# Patient Record
Sex: MALE | Race: WHITE | NOT HISPANIC OR LATINO | Employment: FULL TIME | URBAN - METROPOLITAN AREA
[De-identification: names, ages, dates, MRNs, and addresses within clinical notes are randomized per-mention and may not be internally consistent; named-entity substitution may affect disease eponyms.]

---

## 2017-08-22 ENCOUNTER — OFFICE VISIT (OUTPATIENT)
Dept: FAMILY MEDICINE | Facility: CLINIC | Age: 52
End: 2017-08-22
Payer: COMMERCIAL

## 2017-08-22 VITALS
HEART RATE: 112 BPM | SYSTOLIC BLOOD PRESSURE: 110 MMHG | OXYGEN SATURATION: 98 % | HEIGHT: 69 IN | WEIGHT: 260.13 LBS | RESPIRATION RATE: 16 BRPM | DIASTOLIC BLOOD PRESSURE: 70 MMHG | TEMPERATURE: 98 F | BODY MASS INDEX: 38.53 KG/M2

## 2017-08-22 DIAGNOSIS — G47.00 INSOMNIA, UNSPECIFIED TYPE: Primary | ICD-10-CM

## 2017-08-22 PROCEDURE — 99214 OFFICE O/P EST MOD 30 MIN: CPT | Mod: S$GLB,,, | Performed by: NURSE PRACTITIONER

## 2017-08-22 PROCEDURE — 99999 PR PBB SHADOW E&M-EST. PATIENT-LVL IV: CPT | Mod: PBBFAC,,, | Performed by: NURSE PRACTITIONER

## 2017-08-22 PROCEDURE — 3008F BODY MASS INDEX DOCD: CPT | Mod: S$GLB,,, | Performed by: NURSE PRACTITIONER

## 2017-08-22 RX ORDER — HYDROXYZINE HYDROCHLORIDE 25 MG/1
25 TABLET, FILM COATED ORAL 3 TIMES DAILY PRN
Qty: 30 TABLET | Refills: 0 | Status: SHIPPED | OUTPATIENT
Start: 2017-08-22

## 2017-08-22 RX ORDER — TRAZODONE HYDROCHLORIDE 50 MG/1
50 TABLET ORAL NIGHTLY
Qty: 30 TABLET | Refills: 2 | Status: SHIPPED | OUTPATIENT
Start: 2017-08-22 | End: 2017-09-13 | Stop reason: SDUPTHER

## 2017-08-22 NOTE — LETTER
August 22, 2017      aVnce Montiel   [address] [facility] [place] [place] [address]             [facility]  [address]  [address]  [place] [address]  Phone: [phone]  Fax: [phone] Vance Montiel    Was treated here on 08/22/2017    May Return to work/school on 8/24/2017.    No Restrictions            Keily Lo, NP

## 2017-08-22 NOTE — PATIENT INSTRUCTIONS
Helping Yourself Through Grief and Loss  Do what you can to stay healthy. Reaching out for support will help a great deal. You may find yourself asking: Why? Its normal to seek meaning by asking questions, but theres not always an answer for loss. With time, your loss may still be part of your life, but not the only thing in it.    Take care of yourself  Taking good care of yourself helps your body heal from the physical and emotional symptoms of grief. Pay extra attention to healthy exercise, sleep, and eating routines. What else do you need to feel better? Having family around can help you feel loved. Or you might need a walk or movie with friends to take your mind off things for a little while.  Accept support  Joining the world again is part of healing. These tips may help:  · Stay in touch with family and friends, even if its hard to talk.  · Tell people how they can help. It can be as simple as walking your dog.  · Stick to a daily routine that keeps you connected to friends and family.  · Try to avoid making major decisions   · Attend a support group of people who have been through the same type of loss.  When to get help  There's no normal length of time to grieve. But if you feel stuck and unable to move on, or if it has been 6 months or more since your loss and you still have signs of grief listed below, it may be time for professional help. Seeking professional help is not a sign of weakness. It indicates that you are taking responsibility for your recovery. Be alert to depression and call your healthcare provider if you:  · Cant go to work or take care of the kids.  · Cant eat or sleep normally.  · Feel helpless, hopeless, or worthless.  · Lose interest in hobbies, friends, and activities that used to give pleasure.  · Gain or lose a lot of weight.  · Feel your grief is getting worse, or not getting any better.  · Have repeated thoughts of suicide or of harming yourself. You can also call 9-1-1  or a crisis hotline (located in the yellow pages of your phonebook) if you have these thoughts.  At some point, youll begin thinking about the future. Youll want to look ahead and make plans. To help yourself reach this point, try to do one thing each day to join in life. Keep at it, even if it feels strange at first. Your life can never be exactly the same. But one day youll find youre living life fully again.  Date Last Reviewed: 11/10/2015  © 8291-0748 [employer]. [address] [place] [address]. All rights reserved. This information is not intended as a substitute for professional medical care. Always follow your healthcare professional's instructions.        Grief Reaction  Grief is the feeling that we all have when we lose someone or something that has been important in our life. Grief is an unavoidable and normal reaction to this loss, and can last from months to years. The amount of time depends on different factors. These include how close the person was to you, and how much support you have through the grief process. Symptoms can be both physical and emotional.  Physical reactions:  · Loss of appetite or overeating  · Changes in weight  · Trouble getting to sleep or staying asleep  · Hair loss  · Upset stomach, indigestion, heart burn, abdominal pain, cramping, diarrhea  · Sense of trouble breathing  · Trembling, shakiness  Emotional reactions:  · Sadness  · Anxiety  · Feeling depressed or helpless  · Difficulty concentrating  · Detachment or withdrawal from those around you  · Loss of interest in your normal life and work  Home care  · Allow yourself to feel the pain of your loss. For some, this can be a key part of healing grief. Talk about your pain with others who understand. Share good memories that involve the person, pet, or possession  you lost.  · Take time for yourself. Make it a point to do things that you enjoy (gardening, walking in nature, going to a movie,  etc.).  · Take care of your physical body. Eat a balanced diet (low in saturated fat and high in fruits and vegetables) and establish an exercise plan at least 3 times a week for 30 minutes. Even mild-moderate exercise (like brisk walking) can make you feel better. Get plenty of sleep.  · Avoid the use of alcohol and drugs to cover your emotional pain. This only slows down the emotional healing process.  · Do not isolate yourself from others. Have daily contact with family or friends. Talk about your loss to those closest to you.  · For additional support, meet with your //rabbi, a counselor or therapist, or your own doctor.  · Consider joining a grief support group. Ask your doctor or our staff for information on how to find one in your area.  · If you have been prescribed a medicine to help with your symptoms, take it only as directed. Do not use it with alcohol.  Follow-up care  Follow up with your healthcare provider, or as advised.  Call 911  Call 911 if any of these occur:  · Trouble breathing  · Very confused  · Very drowsy or trouble awakening  · Fainting or loss of consciousness  · Rapid heart rate  · Seizure  · New chest pain that becomes more severe, lasts longer, or spreads into your shoulder, arm, neck, jaw or back  When to seek medical advice  Call your healthcare provider right away if any of these occur:  · Worsening symptoms  · Unable to eat or sleep for three days in a row  · Feeling extreme depression, fear, anxiety, or anger toward yourself or others  · Feeling out of control  · Feeling that you may try to harm yourself  · family or friends express concern over your behavior and ask you to get help  Date Last Reviewed: 9/29/2015  © 8278-5895 [employer]. [address] [place] [address], [place] [address]. All rights reserved. This information is not intended as a substitute for professional medical care. Always follow your healthcare professional's instructions.

## 2017-08-23 ENCOUNTER — OFFICE VISIT (OUTPATIENT)
Dept: FAMILY MEDICINE | Facility: CLINIC | Age: 52
End: 2017-08-23
Payer: COMMERCIAL

## 2017-08-23 VITALS — DIASTOLIC BLOOD PRESSURE: 56 MMHG | SYSTOLIC BLOOD PRESSURE: 90 MMHG

## 2017-08-23 DIAGNOSIS — I10 HYPERTENSION, ESSENTIAL, BENIGN: ICD-10-CM

## 2017-08-23 DIAGNOSIS — E66.01 SEVERE OBESITY (BMI 35.0-39.9) WITH COMORBIDITY: ICD-10-CM

## 2017-08-23 DIAGNOSIS — E78.5 HYPERLIPIDEMIA, UNSPECIFIED HYPERLIPIDEMIA TYPE: ICD-10-CM

## 2017-08-23 DIAGNOSIS — E11.9 TYPE 2 DIABETES MELLITUS WITHOUT COMPLICATION, WITHOUT LONG-TERM CURRENT USE OF INSULIN: ICD-10-CM

## 2017-08-23 DIAGNOSIS — F51.02 ADJUSTMENT INSOMNIA: Primary | ICD-10-CM

## 2017-08-23 PROCEDURE — 3008F BODY MASS INDEX DOCD: CPT | Mod: S$GLB,,, | Performed by: INTERNAL MEDICINE

## 2017-08-23 PROCEDURE — 4010F ACE/ARB THERAPY RXD/TAKEN: CPT | Mod: S$GLB,,, | Performed by: INTERNAL MEDICINE

## 2017-08-23 PROCEDURE — 99999 PR PBB SHADOW E&M-EST. PATIENT-LVL II: CPT | Mod: PBBFAC,,, | Performed by: INTERNAL MEDICINE

## 2017-08-23 PROCEDURE — 99214 OFFICE O/P EST MOD 30 MIN: CPT | Mod: S$GLB,,, | Performed by: INTERNAL MEDICINE

## 2017-08-23 NOTE — LETTER
August 23, 2017      [place] [facility]  [address]  [address]  [place] [address]  Phone: [phone]  Fax: [phone]       Patient: Vance Montiel   YOB: 1965  Date of Visit: 08/23/2017    To Whom It May Concern:    Violeta Montiel  was at [facility] on 08/23/2017. He may return to work/school on 8/28/17 with no restrictions. If you have any questions or concerns, or if I can be of further assistance, please do not hesitate to contact me.    Sincerely,    Pauline Sage MD

## 2017-08-24 ENCOUNTER — CLINICAL SUPPORT (OUTPATIENT)
Dept: FAMILY MEDICINE | Facility: CLINIC | Age: 52
End: 2017-08-24
Payer: COMMERCIAL

## 2017-08-24 VITALS — SYSTOLIC BLOOD PRESSURE: 108 MMHG | DIASTOLIC BLOOD PRESSURE: 60 MMHG

## 2017-08-24 NOTE — PROGRESS NOTES
Pt presented with a BP of 108/60 after holding meds. He states he feels better today and does not feel lightheaded and fatigued like yesterday when he was noted have a BP of 90/56. Pt has purchased BP cuff and was advised to check his BP daily to keep a BP log to present to his PCP. Pt advised to make an appt for f/u with PCP in 2 weeks and hold meds until f/u. Pt with recent 18lb weight loss, which has likely contributed to the lower BP. Pt advised to call back if BP reaches >/=140/90 for further instructions if he has difficulty getting in to see his PCP. He voiced understanding.

## 2017-08-24 NOTE — PROGRESS NOTES
Patient here for Blood pressure check, CZ=615/60, Patient states that he was instructed to hold his BP medication.  Dr Sage notified of patient's BP and she spoke to patient to give him further instructions.

## 2017-08-25 NOTE — PROGRESS NOTES
Patient Name: Vance Montiel    : 1965  MRN: [id number]    Subjective:  Vance is a 51 y.o. male who presents today for     1. Sleep aid following the death of his 26 yo daughter on 2017. Tried tylenol prn without improvement. He is follow by his pcp, Dr. Kline, for chronic medical problems, but was unable to get a sooner appointment    Past Medical History  Past Medical History:   Diagnosis Date    Diabetes mellitus, type 2     Hypertension        Past Surgical History  History reviewed. No pertinent surgical history.    Family History  History reviewed. No pertinent family history.    Social History  Social History     Social History    Marital status:      Spouse name: N/A    Number of children: N/A    Years of education: N/A     Occupational History    Not on file.     Social History Main Topics    Smoking status: Never Smoker    Smokeless tobacco: Never Used    Alcohol use Yes    Drug use: Unknown    Sexual activity: Not on file     Other Topics Concern    Not on file     Social History Narrative    No narrative on file       Allergies  Review of patient's allergies indicates:  No Known Allergies -reviewed and updated      Medications  Reviewed and updated.   Current Outpatient Prescriptions   Medication Sig Dispense Refill    amlodipine (NORVASC) 10 MG tablet Take 10 mg by mouth once daily.  6    atorvastatin (LIPITOR) 40 MG tablet Take 40 mg by mouth nightly.  3    CONTOUR NEXT STRIPS Strp test TWICE DAILY AS DIRECTED  0    lisinopril-hydrochlorothiazide (PRINZIDE,ZESTORETIC) 20-25 mg Tab Take 1 tablet by mouth once daily.  6    metformin (GLUCOPHAGE) 1000 MG tablet Take 1,000 mg by mouth 2 (two) times daily.  3    MICROLET LANCET Misc test AS DIRECTED  3    hydrOXYzine HCl (ATARAX) 25 MG tablet Take 1 tablet (25 mg total) by mouth 3 (three) times daily as needed for Anxiety. (caution drowsiness, no driving) 30 tablet 0    trazodone (DESYREL) 50 MG tablet Take 1 tablet  "(50 mg total) by mouth every evening. 30 tablet 2     No current facility-administered medications for this visit.          Review of Systems   Constitutional: Negative for chills and fever.   Respiratory: Negative for shortness of breath.    Cardiovascular: Negative for chest pain.   Psychiatric/Behavioral: The patient has insomnia.          Physical Exam  /70   Pulse (!) 112   Temp 98.4 °F (36.9 °C) (Oral)   Resp 16   Ht 5' 8.5" (1.74 m)   Wt 118 kg (260 lb 2.3 oz)   SpO2 98%   BMI 38.98 kg/m²   Physical Exam   Constitutional: He is oriented to person, place, and time. He appears well-developed.   HENT:   Head: Normocephalic.   Cardiovascular: Normal rate and regular rhythm.    Pulmonary/Chest: Effort normal and breath sounds normal.   Neurological: He is alert and oriented to person, place, and time.   Skin: He is not diaphoretic.         Assessment/Plan:  Vance Montiel is a 51 y.o. male who presents today for :    Insomnia, secondary to grief  Trial trazodone and atarax prn, offer referral counseling   -     trazodone (DESYREL) 50 MG tablet; Take 1 tablet (50 mg total) by mouth every evening.  Dispense: 30 tablet; Refill: 2  -     hydrOXYzine HCl (ATARAX) 25 MG tablet; Take 1 tablet (25 mg total) by mouth 3 (three) times daily as needed for Anxiety. (caution drowsiness, no driving)  Dispense: 30 tablet; Refill: 0        Return if symptoms worsen or fail to improve.      "

## 2017-08-28 ENCOUNTER — OFFICE VISIT (OUTPATIENT)
Dept: FAMILY MEDICINE | Facility: CLINIC | Age: 52
End: 2017-08-28
Payer: COMMERCIAL

## 2017-08-28 VITALS
SYSTOLIC BLOOD PRESSURE: 156 MMHG | WEIGHT: 265.88 LBS | HEART RATE: 95 BPM | TEMPERATURE: 98 F | OXYGEN SATURATION: 97 % | HEIGHT: 69 IN | DIASTOLIC BLOOD PRESSURE: 90 MMHG | BODY MASS INDEX: 39.38 KG/M2

## 2017-08-28 DIAGNOSIS — E78.5 HYPERLIPIDEMIA, UNSPECIFIED HYPERLIPIDEMIA TYPE: ICD-10-CM

## 2017-08-28 DIAGNOSIS — E11.9 TYPE 2 DIABETES MELLITUS WITHOUT COMPLICATION, WITHOUT LONG-TERM CURRENT USE OF INSULIN: ICD-10-CM

## 2017-08-28 DIAGNOSIS — I10 HYPERTENSION, ESSENTIAL, BENIGN: Primary | ICD-10-CM

## 2017-08-28 PROCEDURE — 3008F BODY MASS INDEX DOCD: CPT | Mod: S$GLB,,, | Performed by: INTERNAL MEDICINE

## 2017-08-28 PROCEDURE — 4010F ACE/ARB THERAPY RXD/TAKEN: CPT | Mod: S$GLB,,, | Performed by: INTERNAL MEDICINE

## 2017-08-28 PROCEDURE — 99999 PR PBB SHADOW E&M-EST. PATIENT-LVL III: CPT | Mod: PBBFAC,,, | Performed by: INTERNAL MEDICINE

## 2017-08-28 PROCEDURE — 99214 OFFICE O/P EST MOD 30 MIN: CPT | Mod: S$GLB,,, | Performed by: INTERNAL MEDICINE

## 2017-08-28 NOTE — LETTER
August 28, 2017      [place] [facility]  [address]  [address]  [place] [address]  Phone: [phone]  Fax: [phone]       Patient: Vance Montiel   YOB: 1965  Date of Visit: 08/28/2017    To Whom It May Concern:    Violeta Montiel  was at [facility] on 08/28/2017. He may return to work/school on 8/29/17 with no restrictions. If you have any questions or concerns, or if I can be of further assistance, please do not hesitate to contact me.    Sincerely,    Pauline Sage MD

## 2017-08-28 NOTE — PROGRESS NOTES
SUBJECTIVE     Chief Complaint   Patient presents with    Hypertension       HPI  Vance Montiel is a 51 y.o. male with multiple medical diagnoses as listed in the medical history and problem list that presents for follow-up for HTN. He has been checking his BP with ranges from 88/56 the same day of presentation last week. He has since had a BP ranging from 101-177/75-93 with a pulse of . He has been holding his BP meds since discharge last week. He did have a headache last night, which he accredits to the elevated BP.  He tried to get in to see his PCP, but there was not an appointment available until 9/7/17 so he presented here today.    PAST MEDICAL HISTORY:  Past Medical History:   Diagnosis Date    Diabetes mellitus, type 2     Hypertension        PAST SURGICAL HISTORY:  History reviewed. No pertinent surgical history.    SOCIAL HISTORY:  Social History     Social History    Marital status:      Spouse name: N/A    Number of children: N/A    Years of education: N/A     Occupational History    Not on file.     Social History Main Topics    Smoking status: Never Smoker    Smokeless tobacco: Never Used    Alcohol use Yes    Drug use: Unknown    Sexual activity: Not on file     Other Topics Concern    Not on file     Social History Narrative    No narrative on file       FAMILY HISTORY:  History reviewed. No pertinent family history.    ALLERGIES AND MEDICATIONS: updated and reviewed.  Review of patient's allergies indicates:  No Known Allergies  Current Outpatient Prescriptions   Medication Sig Dispense Refill    atorvastatin (LIPITOR) 40 MG tablet Take 40 mg by mouth nightly.  3    hydrOXYzine HCl (ATARAX) 25 MG tablet Take 1 tablet (25 mg total) by mouth 3 (three) times daily as needed for Anxiety. (caution drowsiness, no driving) 30 tablet 0    metformin (GLUCOPHAGE) 1000 MG tablet Take 1,000 mg by mouth 2 (two) times daily.  3    trazodone (DESYREL) 50 MG tablet Take 1 tablet  "(50 mg total) by mouth every evening. 30 tablet 2    amlodipine (NORVASC) 10 MG tablet Take 10 mg by mouth once daily.  6    CONTOUR NEXT STRIPS Strp test TWICE DAILY AS DIRECTED  0    lisinopril-hydrochlorothiazide (PRINZIDE,ZESTORETIC) 20-25 mg Tab Take 1 tablet by mouth once daily.  6    MICROLET LANCET Misc test AS DIRECTED  3     No current facility-administered medications for this visit.        ROS  Review of Systems   Constitutional: Negative for chills and fever.   HENT: Negative for hearing loss and sore throat.    Eyes: Negative for visual disturbance.   Respiratory: Negative for cough and shortness of breath.    Cardiovascular: Negative for chest pain, palpitations and leg swelling.   Gastrointestinal: Negative for abdominal pain, constipation, diarrhea, nausea and vomiting.   Genitourinary: Negative for dysuria, frequency and urgency.   Musculoskeletal: Negative for arthralgias, joint swelling and myalgias.   Skin: Negative for rash and wound.   Neurological: Negative for headaches.   Psychiatric/Behavioral: Negative for agitation and confusion. The patient is not nervous/anxious.          OBJECTIVE     Physical Exam  Vitals:    08/28/17 1009   BP: (!) 156/90   Pulse: 95   Temp: 98.4 °F (36.9 °C)    Body mass index is 39.84 kg/m².  Weight: 120.6 kg (265 lb 14 oz)   Height: 5' 8.5" (174 cm)     Physical Exam   Constitutional: He is oriented to person, place, and time. He appears well-developed and well-nourished. No distress.   HENT:   Head: Normocephalic and atraumatic.   Right Ear: External ear normal.   Left Ear: External ear normal.   Nose: Nose normal.   Mouth/Throat: Oropharynx is clear and moist.   Eyes: Conjunctivae and EOM are normal. Right eye exhibits no discharge. Left eye exhibits no discharge. No scleral icterus.   Neck: Normal range of motion. Neck supple. No JVD present. No tracheal deviation present.   Cardiovascular: Intact distal pulses.    Pulmonary/Chest: Effort normal. No " respiratory distress.   Musculoskeletal: Normal range of motion. He exhibits no edema, tenderness or deformity.   Neurological: He is alert and oriented to person, place, and time. He exhibits normal muscle tone. Coordination normal.   Skin: Skin is warm and dry. No rash noted. No erythema.   Psychiatric: He has a normal mood and affect. His behavior is normal. Judgment and thought content normal.         Health Maintenance       Date Due Completion Date    Hepatitis C Screening 1965 ---    Lipid Panel 1965 ---    TETANUS VACCINE 10/09/1983 ---    Colonoscopy 10/09/2015 ---    Influenza Vaccine 10/01/2017 (Originally 8/1/2017) ---            ASSESSMENT     51 y.o. male with     1. Hypertension, essential, benign    2. Type 2 diabetes mellitus without complication, without long-term current use of insulin    3. Hyperlipidemia, unspecified hyperlipidemia type        PLAN:     1. Hypertension, essential, benign  - BP elevated above goal of <140/90  - Pt advised to hold BP meds 2/2 hypoTN, but BP has now become elevated since last night  - Pt advised to resume Lisinopril/HCTZ 20-25 and continue to hold Norvasc 10 mg po q24   - Pt to call back for any BP >/= 140/90, despite full compliance with Prinzide, and will have pt resume Norvasc at that time    2. Type 2 diabetes mellitus without complication, without long-term current use of insulin  - Stable; no acute issues  - The current medical regimen is effective;  continue present plan and medications.  - Continue management per PCP-Dr. Kline    3. Hyperlipidemia, unspecified hyperlipidemia type  - Stable; no acute issues  - The current medical regimen is effective;  continue present plan and medications.  - Continue management per PCP-Dr. Kline        RTC in 2 weeks for repeat assessment of current treatment plan       Pauline Sage MD  08/28/2017 10:15 AM        No Follow-up on file.

## 2017-09-13 ENCOUNTER — TELEPHONE (OUTPATIENT)
Dept: FAMILY MEDICINE | Facility: CLINIC | Age: 52
End: 2017-09-13

## 2017-09-13 DIAGNOSIS — G47.00 INSOMNIA, UNSPECIFIED TYPE: ICD-10-CM

## 2017-09-13 DIAGNOSIS — F51.02 ADJUSTMENT INSOMNIA: Primary | ICD-10-CM

## 2017-09-13 RX ORDER — TRAZODONE HYDROCHLORIDE 50 MG/1
50 TABLET ORAL NIGHTLY
Qty: 90 TABLET | Refills: 1 | Status: SHIPPED | OUTPATIENT
Start: 2017-09-13 | End: 2017-09-14 | Stop reason: DRUGHIGH

## 2017-09-13 NOTE — TELEPHONE ENCOUNTER
----- Message from Donita Vail sent at 9/13/2017  8:17 AM CDT -----  Contact: Self/[phone]  Refill:  trazodone (DESYREL) 50 MG tablet    Thank you.

## 2017-09-13 NOTE — TELEPHONE ENCOUNTER
----- Message from Shae Sterling sent at 9/13/2017  4:24 PM CDT -----  Contact: self  Patient states --trazodone (DESYREL) 50 MG tablet---was supposed to prescribed as  100MG instead of 50MG because he was instructed to double up on the dosage. Patient call back #   [phone].

## 2017-09-14 PROBLEM — F51.02 ADJUSTMENT INSOMNIA: Status: ACTIVE | Noted: 2017-09-14

## 2017-09-14 RX ORDER — TRAZODONE HYDROCHLORIDE 100 MG/1
100 TABLET ORAL NIGHTLY
Qty: 90 TABLET | Refills: 1 | Status: SHIPPED | OUTPATIENT
Start: 2017-09-14 | End: 2018-09-29 | Stop reason: SDUPTHER

## 2017-09-14 NOTE — TELEPHONE ENCOUNTER
Called but not answer. Left message informing him that a new Rx for the appropriate dose(100mg qhs) has been sent to his pharmacy. Pt to call back for any further questions/concerns.

## 2017-09-15 DIAGNOSIS — E11.9 TYPE 2 DIABETES MELLITUS WITHOUT COMPLICATION: ICD-10-CM

## 2017-10-17 DIAGNOSIS — R00.0 TACHYCARDIA: Primary | ICD-10-CM

## 2017-10-27 ENCOUNTER — HOSPITAL ENCOUNTER (OUTPATIENT)
Dept: CARDIOLOGY | Facility: HOSPITAL | Age: 52
Discharge: HOME | End: 2017-10-27
Attending: GENERAL PRACTICE
Payer: COMMERCIAL

## 2017-10-27 DIAGNOSIS — R00.0 TACHYCARDIA: ICD-10-CM

## 2017-10-27 LAB
DIASTOLIC DYSFUNCTION: NO
ESTIMATED PA SYSTOLIC PRESSURE: 24.34
MITRAL VALVE REGURGITATION: NORMAL
RETIRED EF AND QEF - SEE NOTES: 55 (ref 55–65)
TRICUSPID VALVE REGURGITATION: NORMAL

## 2017-10-27 PROCEDURE — 93306 TTE W/DOPPLER COMPLETE: CPT | Mod: 26,,, | Performed by: INTERNAL MEDICINE

## 2017-10-27 PROCEDURE — 93306 TTE W/DOPPLER COMPLETE: CPT

## 2018-02-01 DIAGNOSIS — E11.9 TYPE 2 DIABETES MELLITUS WITHOUT COMPLICATION: ICD-10-CM

## 2018-03-05 ENCOUNTER — OFFICE VISIT (OUTPATIENT)
Dept: FAMILY MEDICINE | Facility: CLINIC | Age: 53
End: 2018-03-05
Payer: COMMERCIAL

## 2018-03-05 VITALS
OXYGEN SATURATION: 97 % | TEMPERATURE: 98 F | HEART RATE: 110 BPM | WEIGHT: 255.5 LBS | SYSTOLIC BLOOD PRESSURE: 176 MMHG | BODY MASS INDEX: 38.29 KG/M2 | DIASTOLIC BLOOD PRESSURE: 94 MMHG

## 2018-03-05 DIAGNOSIS — I10 HYPERTENSION, ESSENTIAL, BENIGN: ICD-10-CM

## 2018-03-05 DIAGNOSIS — R23.8: Primary | ICD-10-CM

## 2018-03-05 PROCEDURE — 99999 PR PBB SHADOW E&M-EST. PATIENT-LVL III: CPT | Mod: PBBFAC,,, | Performed by: INTERNAL MEDICINE

## 2018-03-05 PROCEDURE — 87070 CULTURE OTHR SPECIMN AEROBIC: CPT

## 2018-03-05 PROCEDURE — 3080F DIAST BP >= 90 MM HG: CPT | Mod: S$GLB,,, | Performed by: INTERNAL MEDICINE

## 2018-03-05 PROCEDURE — 3077F SYST BP >= 140 MM HG: CPT | Mod: S$GLB,,, | Performed by: INTERNAL MEDICINE

## 2018-03-05 PROCEDURE — 99214 OFFICE O/P EST MOD 30 MIN: CPT | Mod: S$GLB,,, | Performed by: INTERNAL MEDICINE

## 2018-03-05 PROCEDURE — 87075 CULTR BACTERIA EXCEPT BLOOD: CPT

## 2018-03-05 RX ORDER — DOXYCYCLINE HYCLATE 100 MG
100 TABLET ORAL 2 TIMES DAILY
Qty: 14 TABLET | Refills: 0 | Status: SHIPPED | OUTPATIENT
Start: 2018-03-05

## 2018-03-05 RX ORDER — ESCITALOPRAM OXALATE 10 MG/1
TABLET ORAL
COMMUNITY
Start: 2018-01-16 | End: 2018-03-05 | Stop reason: SDUPTHER

## 2018-03-05 RX ORDER — LISINOPRIL AND HYDROCHLOROTHIAZIDE 12.5; 2 MG/1; MG/1
1 TABLET ORAL DAILY
Refills: 5 | COMMUNITY
Start: 2018-02-20

## 2018-03-05 RX ORDER — ESCITALOPRAM OXALATE 20 MG/1
20 TABLET ORAL DAILY
Refills: 3 | COMMUNITY
Start: 2018-02-20

## 2018-03-05 RX ORDER — LISINOPRIL 20 MG/1
TABLET ORAL
COMMUNITY
Start: 2018-01-16

## 2018-03-05 NOTE — LETTER
March 5, 2018      [place] [facility]  [address]  [address]  [place] [address]  Phone: [phone]  Fax: [phone]       Patient: Vance Montiel   YOB: 1965  Date of Visit: 03/05/2018    To Whom It May Concern:    Violeta Montiel  was at [facility] on 03/05/2018. He may return to work/school on 3/6/18 with no restrictions. If you have any questions or concerns, or if I can be of further assistance, please do not hesitate to contact me.    Sincerely,    Pauline Sage MD

## 2018-03-05 NOTE — PROGRESS NOTES
SUBJECTIVE     Chief Complaint   Patient presents with    Hernia       HPI  Vance Montiel is a 52 y.o. male with multiple medical diagnoses as listed in the medical history and problem list that presents for evaluation of a lesion on his umbilical hernia x 3 days. Pt reports having a hernia x 3 years, but it has never bothered him. He noticed yesterday the presence of a vesicular lesion over the umbilical hernia. He denies any associated pain, erythema, increased warmth, fever, chills, or night sweats. He is without any other complaints today.    PAST MEDICAL HISTORY:  Past Medical History:   Diagnosis Date    Diabetes mellitus, type 2     Hypertension        PAST SURGICAL HISTORY:  History reviewed. No pertinent surgical history.    SOCIAL HISTORY:  Social History     Social History    Marital status:      Spouse name: N/A    Number of children: N/A    Years of education: N/A     Occupational History    Not on file.     Social History Main Topics    Smoking status: Never Smoker    Smokeless tobacco: Never Used    Alcohol use Yes    Drug use: Unknown    Sexual activity: Not on file     Other Topics Concern    Not on file     Social History Narrative    No narrative on file       FAMILY HISTORY:  History reviewed. No pertinent family history.    ALLERGIES AND MEDICATIONS: updated and reviewed.  Review of patient's allergies indicates:  No Known Allergies  Current Outpatient Prescriptions   Medication Sig Dispense Refill    amlodipine (NORVASC) 10 MG tablet Take 10 mg by mouth once daily.  6    atorvastatin (LIPITOR) 40 MG tablet Take 40 mg by mouth nightly.  3    escitalopram oxalate (LEXAPRO) 20 MG tablet Take 20 mg by mouth once daily.  3    hydrOXYzine HCl (ATARAX) 25 MG tablet Take 1 tablet (25 mg total) by mouth 3 (three) times daily as needed for Anxiety. (caution drowsiness, no driving) 30 tablet 0    lisinopril (PRINIVIL,ZESTRIL) 20 MG tablet       metformin (GLUCOPHAGE) 1000 MG  tablet Take 1,000 mg by mouth 2 (two) times daily.  3    trazodone (DESYREL) 100 MG tablet Take 1 tablet (100 mg total) by mouth every evening. 90 tablet 1    CONTOUR NEXT STRIPS Strp test TWICE DAILY AS DIRECTED  0    doxycycline (VIBRA-TABS) 100 MG tablet Take 1 tablet (100 mg total) by mouth 2 (two) times daily. 14 tablet 0    lisinopril-hydrochlorothiazide (PRINZIDE,ZESTORETIC) 20-12.5 mg per tablet Take 1 tablet by mouth once daily.  5    MICROLET LANCET Misc test AS DIRECTED  3     No current facility-administered medications for this visit.        ROS  Review of Systems   Constitutional: Negative for chills and fever.   HENT: Negative for hearing loss and sore throat.    Eyes: Negative for visual disturbance.   Respiratory: Negative for cough and shortness of breath.    Cardiovascular: Negative for chest pain, palpitations and leg swelling.   Gastrointestinal: Negative for abdominal pain, constipation, diarrhea, nausea and vomiting.   Genitourinary: Negative for dysuria, frequency and urgency.   Musculoskeletal: Negative for arthralgias, joint swelling and myalgias.   Skin: Positive for wound (Vesicular lesion to the umbilicus). Negative for rash.   Neurological: Negative for headaches.   Psychiatric/Behavioral: Negative for agitation and confusion. The patient is not nervous/anxious.          OBJECTIVE     Physical Exam  Vitals:    03/05/18 1026   BP: (!) 176/94   Pulse: 110   Temp: 98.4 °F (36.9 °C)    Body mass index is 38.29 kg/m².  Weight: 115.9 kg (255 lb 8.2 oz)         Physical Exam   Constitutional: He is oriented to person, place, and time. He appears well-developed and well-nourished. No distress.   HENT:   Head: Normocephalic and atraumatic.   Right Ear: External ear normal.   Left Ear: External ear normal.   Nose: Nose normal.   Mouth/Throat: Oropharynx is clear and moist.   Eyes: Conjunctivae and EOM are normal. Right eye exhibits no discharge. Left eye exhibits no discharge. No scleral  icterus.   Neck: Normal range of motion. Neck supple. No JVD present. No tracheal deviation present.   Pulmonary/Chest: Effort normal. No respiratory distress.   Musculoskeletal: Normal range of motion. He exhibits no deformity.   Neurological: He is alert and oriented to person, place, and time. He exhibits normal muscle tone. Coordination normal.   Skin: Skin is warm and dry. Rash (erythematous rash to lower abdomen) noted. There is erythema (lower abdomen).   Bullous vesicle over the umbilicus    Psychiatric: He has a normal mood and affect. His behavior is normal. Judgment and thought content normal.         Health Maintenance       Date Due Completion Date    Hepatitis C Screening 1965 ---    Lipid Panel 1965 ---    Hemoglobin A1c 1965 ---    Foot Exam 10/09/1975 ---    Eye Exam 10/09/1975 ---    Pneumococcal PPSV23 (Medium Risk) (1) 10/09/1983 ---    Colonoscopy 10/09/2015 ---    Influenza Vaccine 08/01/2017 ---    TETANUS VACCINE 05/01/2020 (Originally 10/9/1983) ---    Low Dose Statin 03/05/2019 3/5/2018            ASSESSMENT     52 y.o. male with     1. Vesicular eruption, localized    2. Hypertension, essential, benign        PLAN:     1. Vesicular eruption, localized  - doxycycline (VIBRA-TABS) 100 MG tablet; Take 1 tablet (100 mg total) by mouth 2 (two) times daily.  Dispense: 14 tablet; Refill: 0  - CULTURE, AEROBIC  (SPECIFY SOURCE)  - Culture, Anaerobic  - Seek medical attention for any worsening erythema with red streaking, edema, drainage, pain/tenderness, fever, chills, or night sweats    2. Hypertension, essential, benign  - BP elevated above goal of <140/90; likely 2/2 anxiety  - Continue current meds and monitor  - Continue management per PCP-Dr. Kline      RTC in 1-2 weeks as needed for any acute worsening of current condition or failure to improve     Pauline Sage MD  03/05/2018 10:35 AM        No Follow-up on file.

## 2018-03-09 LAB — BACTERIA SPEC AEROBE CULT: NORMAL

## 2018-03-10 LAB — BACTERIA SPEC ANAEROBE CULT: NORMAL

## 2018-06-25 ENCOUNTER — TELEPHONE (OUTPATIENT)
Dept: ADMINISTRATIVE | Facility: HOSPITAL | Age: 53
End: 2018-06-25

## 2018-08-17 DIAGNOSIS — Z11.59 NEED FOR HEPATITIS C SCREENING TEST: Primary | ICD-10-CM

## 2018-09-29 DIAGNOSIS — F51.02 ADJUSTMENT INSOMNIA: ICD-10-CM

## 2018-10-01 RX ORDER — TRAZODONE HYDROCHLORIDE 100 MG/1
TABLET ORAL
Qty: 90 TABLET | Refills: 1 | Status: SHIPPED | OUTPATIENT
Start: 2018-10-01

## 2018-11-16 DIAGNOSIS — E11.9 TYPE 2 DIABETES MELLITUS WITHOUT COMPLICATION: ICD-10-CM

## 2019-11-06 ENCOUNTER — TELEPHONE (OUTPATIENT)
Dept: FAMILY MEDICINE | Facility: CLINIC | Age: 54
End: 2019-11-06

## 2019-11-06 NOTE — TELEPHONE ENCOUNTER
LM for patient that they're over due for their Physical, to please call our office at [phone] to schedule your appointment.

## 2020-02-14 DIAGNOSIS — E11.9 TYPE 2 DIABETES MELLITUS WITHOUT COMPLICATION, WITHOUT LONG-TERM CURRENT USE OF INSULIN: ICD-10-CM

## 2022-02-13 ENCOUNTER — HOSPITAL ENCOUNTER (EMERGENCY)
Facility: HOSPITAL | Age: 57
Discharge: HOME | End: 2022-02-13
Attending: EMERGENCY MEDICINE
Payer: COMMERCIAL

## 2022-02-13 VITALS
BODY MASS INDEX: 38.65 KG/M2 | RESPIRATION RATE: 12 BRPM | HEART RATE: 79 BPM | WEIGHT: 255 LBS | HEIGHT: 68 IN | DIASTOLIC BLOOD PRESSURE: 57 MMHG | TEMPERATURE: 98 F | OXYGEN SATURATION: 95 % | SYSTOLIC BLOOD PRESSURE: 98 MMHG

## 2022-02-13 DIAGNOSIS — R41.82 ALTERED MENTAL STATUS: ICD-10-CM
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
## 2022-02-13 DIAGNOSIS — F10.929 ALCOHOLIC INTOXICATION WITH COMPLICATION: ICD-10-CM

## 2022-02-13 LAB
ALBUMIN SERPL BCP-MCNC: 3.4 G/DL (ref 3.5–5.2)
ALP SERPL-CCNC: 61 U/L (ref 55–135)
ALT SERPL W/O P-5'-P-CCNC: <5 U/L (ref 10–44)
ANION GAP SERPL CALC-SCNC: 17 MMOL/L (ref 8–16)
AST SERPL-CCNC: 29 U/L (ref 10–40)
BASOPHILS # BLD AUTO: 0.08 K/UL (ref 0–0.2)
BASOPHILS NFR BLD: 1.3 % (ref 0–1.9)
BILIRUB SERPL-MCNC: 0.1 MG/DL (ref 0.1–1)
BUN SERPL-MCNC: 20 MG/DL (ref 6–20)
CALCIUM SERPL-MCNC: 8.5 MG/DL (ref 8.7–10.5)
CHLORIDE SERPL-SCNC: 100 MMOL/L (ref 95–110)
CO2 SERPL-SCNC: 20 MMOL/L (ref 23–29)
CREAT SERPL-MCNC: 1.5 MG/DL (ref 0.5–1.4)
DIFFERENTIAL METHOD: ABNORMAL
EOSINOPHIL # BLD AUTO: 0.3 K/UL (ref 0–0.5)
EOSINOPHIL NFR BLD: 4.2 % (ref 0–8)
ERYTHROCYTE [DISTWIDTH] IN BLOOD BY AUTOMATED COUNT: 13.4 % (ref 11.5–14.5)
EST. GFR  (AFRICAN AMERICAN): 59 ML/MIN/1.73 M^2
EST. GFR  (NON AFRICAN AMERICAN): 51 ML/MIN/1.73 M^2
ETHANOL SERPL-MCNC: 379 MG/DL
GLUCOSE SERPL-MCNC: 207 MG/DL (ref 70–110)
HCT VFR BLD AUTO: 40.4 % (ref 40–54)
HGB BLD-MCNC: 13.4 G/DL (ref 14–18)
IMM GRANULOCYTES # BLD AUTO: 0.02 K/UL (ref 0–0.04)
IMM GRANULOCYTES NFR BLD AUTO: 0.3 % (ref 0–0.5)
LYMPHOCYTES # BLD AUTO: 3.1 K/UL (ref 1–4.8)
LYMPHOCYTES NFR BLD: 51.1 % (ref 18–48)
MAGNESIUM SERPL-MCNC: 2.5 MG/DL (ref 1.6–2.6)
MCH RBC QN AUTO: 30.9 PG (ref 27–31)
MCHC RBC AUTO-ENTMCNC: 33.2 G/DL (ref 32–36)
MCV RBC AUTO: 93 FL (ref 82–98)
MONOCYTES # BLD AUTO: 0.6 K/UL (ref 0.3–1)
MONOCYTES NFR BLD: 10.1 % (ref 4–15)
NEUTROPHILS # BLD AUTO: 2 K/UL (ref 1.8–7.7)
NEUTROPHILS NFR BLD: 33 % (ref 38–73)
NRBC BLD-RTO: 0 /100 WBC
PLATELET # BLD AUTO: 248 K/UL (ref 150–450)
PMV BLD AUTO: 10.2 FL (ref 9.2–12.9)
POCT GLUCOSE: 220 MG/DL (ref 70–110)
POTASSIUM SERPL-SCNC: 4.3 MMOL/L (ref 3.5–5.1)
PROT SERPL-MCNC: 7.2 G/DL (ref 6–8.4)
RBC # BLD AUTO: 4.34 M/UL (ref 4.6–6.2)
SODIUM SERPL-SCNC: 137 MMOL/L (ref 136–145)
TROPONIN I SERPL DL<=0.01 NG/ML-MCNC: <0.006 NG/ML (ref 0–0.03)
WBC # BLD AUTO: 6.12 K/UL (ref 3.9–12.7)

## 2022-02-13 PROCEDURE — 80053 COMPREHEN METABOLIC PANEL: CPT | Performed by: EMERGENCY MEDICINE

## 2022-02-13 PROCEDURE — 82962 GLUCOSE BLOOD TEST: CPT

## 2022-02-13 PROCEDURE — 84484 ASSAY OF TROPONIN QUANT: CPT | Performed by: EMERGENCY MEDICINE

## 2022-02-13 PROCEDURE — 36000 PLACE NEEDLE IN VEIN: CPT

## 2022-02-13 PROCEDURE — 85025 COMPLETE CBC W/AUTO DIFF WBC: CPT | Performed by: EMERGENCY MEDICINE

## 2022-02-13 PROCEDURE — 99285 EMERGENCY DEPT VISIT HI MDM: CPT | Mod: 25

## 2022-02-13 PROCEDURE — 82077 ASSAY SPEC XCP UR&BREATH IA: CPT | Performed by: EMERGENCY MEDICINE

## 2022-02-13 PROCEDURE — 83735 ASSAY OF MAGNESIUM: CPT | Performed by: EMERGENCY MEDICINE

## 2022-02-13 NOTE — DISCHARGE INSTRUCTIONS
You were seen in the emergency department for alcohol intoxication.  Your exam was reassuring.  You became more sober over time. Please return for any new or worsening shortness of breath, nausea, vomiting, chest pain, severe headache, numbness, weakness, changes in vision, or other new or worsening concerns.

## 2022-02-13 NOTE — ED NOTES
Per EMS, pt  Is ETOH. Pt fell in the bar and hit his head. Abrasion noted to right knee. Pt denies pain  At this time. No respiratory distress noted. Will continue to monitor.

## 2022-02-13 NOTE — ED PROVIDER NOTES
Encounter Date: 2/13/2022    SCRIBE #1 NOTE: I, Alyson Li, am scribing for, and in the presence of, Grey Garcia MD.       History     Chief Complaint   Patient presents with    Fall     ETOH, fell from a barstool. Denies any head trauma. No obvious signs of injury or trauma. NAD noted.     Vance Montiel is a 56 y.o. male, with a PMHx of DM, HTN, who presents to the ED with head trauma s/p fall occurring PTA tonight. Patient was consuming EtOH tonight when he fell off a bar stool, causing him to hit his head. Patient states he is unsure what happened tonight. This is the extent of the patient's complaints today in the Emergency Department.     The history is provided by the EMS personnel and the patient.     Review of patient's allergies indicates:  No Known Allergies  Past Medical History:   Diagnosis Date    Diabetes mellitus, type 2     Hypertension      No past surgical history on file.  No family history on file.  Social History     Tobacco Use    Smoking status: Never Smoker    Smokeless tobacco: Never Used   Substance Use Topics    Alcohol use: Yes     Review of Systems   Unable to perform ROS: Other (EtOH intoxication)       Physical Exam     Initial Vitals [02/13/22 0517]   BP Pulse Resp Temp SpO2   132/78 78 16 98 °F (36.7 °C) 98 %      MAP       --         Physical Exam    Nursing note and vitals reviewed.  Constitutional: He appears well-developed and well-nourished. He is not diaphoretic. No distress.   Intoxicated. Smells of EtOH.   HENT:   Head: Normocephalic and atraumatic.   Mouth/Throat: Oropharynx is clear and moist.   Eyes: Conjunctivae and EOM are normal. Pupils are equal, round, and reactive to light. Right eye exhibits no discharge. Left eye exhibits no discharge. No scleral icterus.   Neck: Neck supple. No thyromegaly present. No tracheal deviation present. No JVD present.   Normal range of motion.  Cardiovascular: Normal rate, regular rhythm, normal heart sounds and intact  distal pulses. Exam reveals no gallop and no friction rub.    No murmur heard.  Pulmonary/Chest: Breath sounds normal. No stridor. No respiratory distress. He has no wheezes. He has no rhonchi. He has no rales. He exhibits no tenderness.   Abdominal: Abdomen is soft. He exhibits no distension and no mass. There is no abdominal tenderness. There is no rebound and no guarding.   Musculoskeletal:         General: No tenderness or edema. Normal range of motion.      Cervical back: Normal range of motion and neck supple.     Lymphadenopathy:     He has no cervical adenopathy.   Neurological: He is alert and oriented to person, place, and time. He has normal strength. GCS eye subscore is 4. GCS verbal subscore is 5. GCS motor subscore is 6.   GINETTE with 5/5 strength.  No facial droop present.     Skin: Skin is warm and dry. Capillary refill takes less than 2 seconds. No rash and no abscess noted. No erythema. No pallor.   Psychiatric:   Pt endorses drinking and appears clinically intoxicated.         ED Course   Procedures  Labs Reviewed   COMPREHENSIVE METABOLIC PANEL - Abnormal; Notable for the following components:       Result Value    CO2 20 (*)     Glucose 207 (*)     Creatinine 1.5 (*)     Calcium 8.5 (*)     Albumin 3.4 (*)     ALT <5 (*)     Anion Gap 17 (*)     eGFR if  59 (*)     eGFR if non  51 (*)     All other components within normal limits   CBC W/ AUTO DIFFERENTIAL - Abnormal; Notable for the following components:    RBC 4.34 (*)     Hemoglobin 13.4 (*)     Gran % 33.0 (*)     Lymph % 51.1 (*)     All other components within normal limits   ALCOHOL,MEDICAL (ETHANOL) - Abnormal; Notable for the following components:    Alcohol, Serum 379 (*)     All other components within normal limits    Narrative:      Alcohol  critical result(s) called and verbal readback obtained from   Clarence Brownlee. by PHYLLIS 02/13/2022 07:33   POCT GLUCOSE - Abnormal; Notable for the following components:     POCT Glucose 220 (*)     All other components within normal limits   TROPONIN I   MAGNESIUM   ALCOHOL,MEDICAL (ETHANOL)   POCT GLUCOSE MONITORING CONTINUOUS     EKG Readings: (Independently Interpreted)   Initial Reading: No STEMI. Rhythm: Normal Sinus Rhythm. Heart Rate: 73. Ectopy: No Ectopy. Conduction: Normal. ST Segments: Normal ST Segments. T Waves Flipped: AVR and V1. Axis: Normal. Clinical Impression: Normal Sinus Rhythm       Imaging Results          CT Cervical Spine Without Contrast (Final result)  Result time 02/13/22 06:55:56    Final result by Jose Miguel Dominguez DO (02/13/22 06:55:56)                 Impression:      1. No acute fracture or subluxation of the cervical spine.  2. Multilevel degenerative changes of the cervical spine as detailed above.      Electronically signed by: Jose Miguel Dominguez  Date:    02/13/2022  Time:    06:55             Narrative:    EXAMINATION:  CT CERVICAL SPINE WITHOUT CONTRAST    CLINICAL HISTORY:  Neck trauma, intoxicated or obtunded (Age >= 16y);    TECHNIQUE:  Low dose axial images, sagittal and coronal reformations were performed though the cervical spine without intravenous contrast.    COMPARISON:  None available.    FINDINGS:  Alignment: There is straightening of the usual cervical lordosis.  There is grade 1 anterolisthesis of C2 on C3.    Vertebra: Slightly limited evaluation due thickened [place] to artifact from the patient's shoulders/soft tissues.  There is no acute fracture or subluxation of the cervical spine.  The vertebral body heights are maintained.  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  There are endplate degenerative changes from C4 through C7.    Discs: There is mild disc height loss from C2 through C6.    Cord: The contents of the spinal canal are not well visualized on non-contrast CT.    Degenerative changes: There are multilevel degenerative changes of the cervical spine.    C2-C3: Severe bilateral facet arthropathy  resulting in mild neural foraminal narrowing bilaterally.  No osseous spinal canal stenosis.    C3-C4: Uncinate process spurring and right greater left facet arthropathy result in moderate right, mild left neural foraminal narrowing.  No osseous spinal canal stenosis.    C4-C5: Small posterior disc osteophyte complex and bilateral uncinate process spurring resulting in mild spinal canal stenosis and moderate bilateral neural foraminal narrowing.    C5-C6: Small posterior disc osteophyte complex and bilateral uncinate process spurring resulting in mild bilateral neural foraminal narrowing and mild spinal canal stenosis.    C6-C7: Posterior disc osteophyte complex and facet arthropathy result in mild spinal canal stenosis and moderate bilateral neural foraminal narrowing.    C7-T1: Moderate bilateral facet arthropathy contributes to mild neural foraminal narrowing.  No spinal canal stenosis.    Miscellaneous: The soft tissues of the neck are unremarkable.  The visualized lung apices are clear.  Incidentally noted retropharyngeal ICAs.  Mild atherosclerotic calcifications of the left carotid bifurcation.                               CT Head Without Contrast (Final result)  Result time 02/13/22 06:51:49    Final result by Jose Miguel Dominguez DO (02/13/22 06:51:49)                 Impression:      1. No acute intracranial abnormality.  2. Paranasal sinus disease as above.      Electronically signed by: Jose Miguel Dominguez  Date:    02/13/2022  Time:    06:51             Narrative:    EXAMINATION:  CT HEAD WITHOUT CONTRAST    CLINICAL HISTORY:  Head trauma, abnormal mental status (Age 19-64y);    TECHNIQUE:  Low dose axial CT images obtained throughout the head without intravenous contrast. Sagittal and coronal reconstructions were performed.    COMPARISON:  None available.    FINDINGS:  Ventricles and sulci are normal in size for age without evidence of hydrocephalus. No extra-axial blood or fluid collections.  The brain  parenchyma is normal. No parenchymal mass, hemorrhage, edema or major vascular distribution infarct.    No calvarial fracture.  The scalp is unremarkable.  There are multiple mucous retention cysts and sinus mucosal thickening of the right maxillary sinus.  There is a small amount of fluid layering within the right sphenoid sinus.  There is patchy mucosal thickening of the bilateral ethmoid sinuses with mild fluid in the right frontal sinus.  The mastoid air cells are clear.  Incidentally noted congenital fusion anomaly of the posterior arch of C1.                               X-Ray Chest AP Portable (Final result)  Result time 02/13/22 06:23:02    Final result by Jose Miguel Dominguez DO (02/13/22 06:23:02)                 Impression:      No acute cardiopulmonary abnormality.      Electronically signed by: Jose Miguel Dominguez  Date:    02/13/2022  Time:    06:23             Narrative:    EXAMINATION:  XR CHEST AP PORTABLE    CLINICAL HISTORY:  altered mental status;    TECHNIQUE:  Single frontal view of the chest was performed.    COMPARISON:  None    FINDINGS:  The lungs are well expanded and clear. No focal opacities are seen. The pleural spaces are clear.    The cardiac silhouette is unremarkable.    The visualized osseous structures are unremarkable.                                 Medications - No data to display  Medical Decision Making:   History:   Old Medical Records: I decided to obtain old medical records.  Independently Interpreted Test(s):   I have ordered and independently interpreted EKG Reading(s) - see prior notes  Clinical Tests:   Lab Tests: Ordered and Reviewed  Radiological Study: Ordered and Reviewed  Medical Tests: Ordered and Reviewed  ED Management:  Moshe Paulson  0700 - accepted care of patient from Dr. Garcia.  Patient now awake, alert, speaking with clear voice, walking with steady gait.  Patient plans to have his stepfather pick him up to give him a ride.  Well-appearing, vitals  normalized.  Clinically sobriety reached and appropriate for discharge home.  I have advised the patient not to drive a vehicle operate heavy machinery or consume further alcohol or take sedating medications.          Scribe Attestation:   Scribe #1: I performed the above scribed service and the documentation accurately describes the services I performed. I attest to the accuracy of the note.                 Pt arrived alert, afebrile, non-toxic in appearance, in no acute respiratory distress and endorsing drinking heavily tonight with VSS.  EKG revealed no acute findings concerning for ischemia, arrhythmia, heart block or any pathology to warrant cardiology consultation.  BG of 220.  CT head and c-spine  returned unremarkable.  Pt care handed over to Dr. Paulson pending return of labs for further evaluation and management.    Grey Garcia MD  7:01 AM      Clinical Impression:   Final diagnoses:  [R41.82] Altered mental status           I, Grey Garcia, personally performed the services described in this documentation. All medical record entries made by the scribe were at my direction and in my presence.  I have reviewed the chart and agree that the record reflects my personal performance and is accurate and complete.    Grey Garcia MD  02/13/22 0740       Moshe Paulson MD  02/13/22 1020

## 2022-02-13 NOTE — ED NOTES
Pt remains unsteady on his feet. Pt attempted to provide urine sample, pt dislodged IV in process and was incontinent of urine at this time. Pt cleaned up and placed back into bed, pt promptly fell asleep. Vital signs stable. Dr Paulson made aware of loss of IV. Not for replacement at this time.

## 2022-09-09 NOTE — PROGRESS NOTES
SUBJECTIVE     Chief Complaint   Patient presents with    Insomnia       HPI  Vance Montiel is a 51 y.o. male with multiple medical diagnoses as listed in the medical history and problem list that presents for follow-up for Insomnia. Pt was seen yesterday as he has been having difficulty sleeping for ~1 month after the passing of his daughter. He was given a Rx for Trazodone yesterday and only got 3 hours of sleep.     PAST MEDICAL HISTORY:  Past Medical History:   Diagnosis Date    Diabetes mellitus, type 2     Hypertension        PAST SURGICAL HISTORY:  History reviewed. No pertinent surgical history.    SOCIAL HISTORY:  Social History     Social History    Marital status:      Spouse name: N/A    Number of children: N/A    Years of education: N/A     Occupational History    Not on file.     Social History Main Topics    Smoking status: Never Smoker    Smokeless tobacco: Never Used    Alcohol use Yes    Drug use: Unknown    Sexual activity: Not on file     Other Topics Concern    Not on file     Social History Narrative    No narrative on file       FAMILY HISTORY:  History reviewed. No pertinent family history.    ALLERGIES AND MEDICATIONS: updated and reviewed.  Review of patient's allergies indicates:  No Known Allergies  Current Outpatient Prescriptions   Medication Sig Dispense Refill    amlodipine (NORVASC) 10 MG tablet Take 10 mg by mouth once daily.  6    atorvastatin (LIPITOR) 40 MG tablet Take 40 mg by mouth nightly.  3    CONTOUR NEXT STRIPS Strp test TWICE DAILY AS DIRECTED  0    hydrOXYzine HCl (ATARAX) 25 MG tablet Take 1 tablet (25 mg total) by mouth 3 (three) times daily as needed for Anxiety. (caution drowsiness, no driving) 30 tablet 0    lisinopril-hydrochlorothiazide (PRINZIDE,ZESTORETIC) 20-25 mg Tab Take 1 tablet by mouth once daily.  6    metformin (GLUCOPHAGE) 1000 MG tablet Take 1,000 mg by mouth 2 (two) times daily.  3    MICROLET LANCET Misc test AS DIRECTED   3    trazodone (DESYREL) 50 MG tablet Take 1 tablet (50 mg total) by mouth every evening. 30 tablet 2     No current facility-administered medications for this visit.        ROS  Review of Systems   Constitutional: Negative for chills and fever.   HENT: Negative for hearing loss and sore throat.    Eyes: Negative for visual disturbance.   Respiratory: Negative for cough and shortness of breath.    Cardiovascular: Negative for chest pain, palpitations and leg swelling.   Gastrointestinal: Negative for abdominal pain, constipation, diarrhea, nausea and vomiting.   Genitourinary: Negative for dysuria, frequency and urgency.   Musculoskeletal: Negative for arthralgias, joint swelling and myalgias.   Skin: Negative for rash and wound.   Neurological: Negative for headaches.   Psychiatric/Behavioral: Positive for sleep disturbance. Negative for agitation and confusion.         OBJECTIVE     Physical Exam  Vitals:    08/23/17 1405   BP: (!) 90/56    There is no height or weight on file to calculate BMI.            Physical Exam   Constitutional: He is oriented to person, place, and time. He appears well-developed and well-nourished. No distress.   HENT:   Head: Normocephalic and atraumatic.   Right Ear: External ear normal.   Left Ear: External ear normal.   Nose: Nose normal.   Mouth/Throat: Oropharynx is clear and moist.   Eyes: Conjunctivae and EOM are normal. Right eye exhibits no discharge. Left eye exhibits no discharge. No scleral icterus.   Neck: Normal range of motion. Neck supple. No JVD present. No tracheal deviation present.   Cardiovascular: Intact distal pulses.    Pulmonary/Chest: Effort normal and breath sounds normal. No respiratory distress. He has no wheezes.   Musculoskeletal: Normal range of motion. He exhibits no edema or deformity.   Neurological: He is alert and oriented to person, place, and time. He exhibits normal muscle tone. Coordination normal.   Skin: Skin is warm and dry. No rash noted. No  erythema.   Psychiatric: He has a normal mood and affect. His behavior is normal. Judgment and thought content normal.         Health Maintenance       Date Due Completion Date    Hepatitis C Screening 1965 ---    Lipid Panel 1965 ---    TETANUS VACCINE 10/09/1983 ---    Colonoscopy 10/09/2015 ---    Influenza Vaccine 10/01/2017 (Originally 8/1/2017) ---            ASSESSMENT     51 y.o. male with     1. Adjustment insomnia    2. Hypertension, essential, benign    3. Hyperlipidemia, unspecified hyperlipidemia type    4. Type 2 diabetes mellitus without complication, without long-term current use of insulin    5. Severe obesity (BMI 35.0-39.9) with comorbidity        PLAN:     1. Adjustment insomnia  - Pt with insomnia since the passing of his daughter  - Has not had improvement with Trazodone 50 mg po qhs  - Advised him to take 2 tabs by nightly x 3 days and if no improvement okay to take 3 tabs by nightly x 3 days and up to a max of 4 pills nightly to get the desired effect; pt voiced understanding   - Discussed potential adverse side effects, including priapism for which pt was advised to go straight to the ED; pt voiced understanding    2. Hypertension, essential, benign  - BP low today; <140/90  - Advised to hold BP meds until returning for nurse visit BP check on tomorrow  - Also advised to keep BP log to present to next visit  - Will provide further instructions on resuming meds after BP check tomorrow    3. Hyperlipidemia, unspecified hyperlipidemia type  - Stable; no acute issues  - Continue management per PCP    4. Type 2 diabetes mellitus without complication, without long-term current use of insulin  - Stable; no acute issues  - Continue management per PCP    5. Severe obesity (BMI 35.0-39.9) with comorbidity  - Pt aware of importance of eating a prudent diet and exericising        RTC in 1 day for nurse visit BP check     Pauline Sage MD  08/23/2017 2:12 PM        No Follow-up on  file.               no dysuria, no frequency, and no hematuria.